# Patient Record
Sex: FEMALE | Race: WHITE | URBAN - METROPOLITAN AREA
[De-identification: names, ages, dates, MRNs, and addresses within clinical notes are randomized per-mention and may not be internally consistent; named-entity substitution may affect disease eponyms.]

---

## 2017-01-09 ENCOUNTER — OFFICE VISIT (OUTPATIENT)
Dept: FAMILY MEDICINE | Facility: CLINIC | Age: 52
End: 2017-01-09
Payer: COMMERCIAL

## 2017-01-09 VITALS
SYSTOLIC BLOOD PRESSURE: 112 MMHG | TEMPERATURE: 99.4 F | HEART RATE: 74 BPM | WEIGHT: 154.6 LBS | BODY MASS INDEX: 23.84 KG/M2 | DIASTOLIC BLOOD PRESSURE: 62 MMHG

## 2017-01-09 DIAGNOSIS — R21 RASH: ICD-10-CM

## 2017-01-09 DIAGNOSIS — R07.0 THROAT PAIN: Primary | ICD-10-CM

## 2017-01-09 DIAGNOSIS — J01.90 ACUTE SINUSITIS WITH SYMPTOMS > 10 DAYS: ICD-10-CM

## 2017-01-09 LAB
DEPRECATED S PYO AG THROAT QL EIA: NORMAL
MICRO REPORT STATUS: NORMAL
SPECIMEN SOURCE: NORMAL

## 2017-01-09 PROCEDURE — 99000 SPECIMEN HANDLING OFFICE-LAB: CPT | Performed by: PHYSICIAN ASSISTANT

## 2017-01-09 PROCEDURE — 99213 OFFICE O/P EST LOW 20 MIN: CPT | Performed by: PHYSICIAN ASSISTANT

## 2017-01-09 PROCEDURE — 87880 STREP A ASSAY W/OPTIC: CPT | Performed by: PHYSICIAN ASSISTANT

## 2017-01-09 PROCEDURE — 87081 CULTURE SCREEN ONLY: CPT | Mod: 90 | Performed by: PHYSICIAN ASSISTANT

## 2017-01-09 RX ORDER — DESONIDE 0.5 MG/G
CREAM TOPICAL
Qty: 60 G | Refills: 0 | Status: SHIPPED | OUTPATIENT
Start: 2017-01-09

## 2017-01-09 ASSESSMENT — ENCOUNTER SYMPTOMS
COUGH: 1
WEAKNESS: 0
HEADACHES: 1
SORE THROAT: 0
VOMITING: 0
MUSCULOSKELETAL NEGATIVE: 1
SPUTUM PRODUCTION: 0
WHEEZING: 0
EYE PAIN: 0
EYE REDNESS: 0
FEVER: 0
NAUSEA: 0
PSYCHIATRIC NEGATIVE: 1
CHILLS: 0
EYE DISCHARGE: 0
SHORTNESS OF BREATH: 0
STRIDOR: 0

## 2017-01-09 NOTE — Clinical Note
[facility]  [address] [place] [address]  Phone: [phone]  Fax: [phone]    January 11, 2017    Marlene Spear  [id number] [facility] [address]              Dear MsBarbara Spear,        The results of your recent throat culture were negative.  If you have any further questions or concerns please contact the clinic            Sincerely,      Kelby Caban PA-C/ rohan

## 2017-01-09 NOTE — PROGRESS NOTES
HPI    SUBJECTIVE:                                                    Marlene Spear is a 51 year old female who presents to clinic today for 10-day history of sore throat, postnasal drainage as well as a cough.    ENT Symptoms             Symptoms: cc Present Absent Comment   Fever/Chills  x  Chills    Fatigue  x     Muscle Aches  x     Eye Irritation  x     Sneezing   x    Nasal Devendra/Drg  x     Sinus Pressure/Pain  x     Loss of smell  x     Dental pain  x     Sore Throat  x  Better today then yesterday    Swollen Glands   x    Ear Pain/Fullness  x     Cough  x     Wheeze   x    Chest Pain   x    Shortness of breath   x    Rash   x    Other         Symptom duration:  10 days   Symptom severity:     Treatments tried:     Contacts:  Co workers      Problem list and histories reviewed & adjusted, as indicated.  Additional history: as documented    Patient Active Problem List   Diagnosis     CARDIOVASCULAR SCREENING; LDL GOAL LESS THAN 160     Papilloma of breast     Bloody discharge from nipple     Past Surgical History   Procedure Laterality Date     Surgical history of -        vag delivery     Biopsy breast needle localization  12/26/2012     Procedure: BIOPSY BREAST NEEDLE LOCALIZATION;  Left Breast needle guided excisional biopsy;  Surgeon: Lenore Carrillo MD;  Location: [facility] OR       Social History   Substance Use Topics     Smoking status: Never Smoker      Smokeless tobacco: Never Used     Alcohol Use: Yes      Comment: rare     Family History   Problem Relation Age of Onset     Hypertension Mother      DIABETES Mother      CANCER Mother      breast -metastatic     CANCER Father      myelodysplasia, and prostate cancer     Hypertension Maternal Grandmother      HEART DISEASE Maternal Grandfather      MI     Neurologic Disorder Paternal Grandmother      Parkinson     Prostate Cancer Paternal Grandfather      Hypertension Brother      Obesity Brother      HEART DISEASE Brother      Sammie parkinson-  white syndrome     Congenital Anomalies Daughter      Sprenglers Deformity     CANCER Paternal Aunt      pancreatic      Alzheimer Disease Father      suspected     DIABETES Brother          Current Outpatient Prescriptions   Medication Sig Dispense Refill     amoxicillin-clavulanate (AUGMENTIN) 875-125 MG per tablet Take 1 tablet by mouth 2 times daily 20 tablet 0     desonide (DESOWEN) 0.05 % cream Apply sparingly to affected area three times daily as needed. 60 g 0     MULTIVITAMINS OR TABS ONE DAILY 100 3     IBUPROFEN 200 MG OR CAPS 1 CAPSULE EVERY 4 TO 6 HOURS AS NEEDED       VITAMIN D3 1000 UNIT OR CAPS 1 CAPSULE DAILY       VITAMIN B-6 100 MG OR TABS one to two daily       Allergies   Allergen Reactions     Nkda [No Known Drug Allergies]      Problem list, Medication list, Allergies, and Medical/Social/Surgical histories reviewed in [facility] and updated as appropriate.          Review of Systems   Constitutional: Negative for fever and chills.   HENT: Positive for congestion. Negative for ear discharge, ear pain, hearing loss and sore throat.    Eyes: Negative for pain, discharge and redness.   Respiratory: Positive for cough. Negative for sputum production, shortness of breath, wheezing and stridor.    Cardiovascular: Negative for chest pain.   Gastrointestinal: Negative for nausea and vomiting.   Genitourinary: Negative.    Musculoskeletal: Negative.    Skin: Negative for itching and rash.   Neurological: Positive for headaches. Negative for weakness.   Endo/Heme/Allergies: Negative.    Psychiatric/Behavioral: Negative.          Physical Exam   Constitutional: She is oriented to person, place, and time and well-developed, well-nourished, and in no distress.   HENT:   Head: Normocephalic and atraumatic.   Right Ear: Hearing, tympanic membrane, external ear and ear canal normal.   Left Ear: Hearing, tympanic membrane, external ear and ear canal normal.   Nose: Sinus tenderness present. Right sinus exhibits  maxillary sinus tenderness. Left sinus exhibits maxillary sinus tenderness.   Mouth/Throat: Oropharyngeal exudate present.   Eyes: Conjunctivae, EOM and lids are normal. Pupils are equal, round, and reactive to light.   Neck: Normal range of motion and full passive range of motion without pain. Neck supple. Carotid bruit is not present. No tracheal deviation present. No thyroid mass and no thyromegaly present.   Cardiovascular: Normal rate, regular rhythm, normal heart sounds and normal pulses.    Pulmonary/Chest: Effort normal and breath sounds normal.   Abdominal: Soft. Normal appearance. There is no tenderness.   Musculoskeletal: Normal range of motion.   Lymphadenopathy:     She has no cervical adenopathy.   Neurological: She is alert and oriented to person, place, and time.   Skin: Skin is warm, dry and intact.   Psychiatric: Mood, memory, affect and judgment normal.       (R07.0) Throat pain  (primary encounter diagnosis)  Comment:   Plan: Strep, Rapid Screen, Beta strep group A culture            (J01.90) Acute sinusitis with symptoms > 10 days  Comment:   Plan: amoxicillin-clavulanate (AUGMENTIN) 875-125 MG         per tablet            (R21) Rash  Comment:   Plan: desonide (DESOWEN) 0.05 % cream            Strep screen was negative and this appears to be a sinus infection will treat with Augmentin

## 2017-01-09 NOTE — NURSING NOTE
"Chief Complaint   Patient presents with     Throat Problem     Sinus Problem     /62 mmHg  Pulse 74  Temp(Src) 99.4  F (37.4  C) (Tympanic)  Wt 154 lb 9.6 oz (70.126 kg) Estimated body mass index is 23.84 kg/(m^2) as calculated from the following:    Height as of 1/8/13: 5' 7.5\" (1.715 m).    Weight as of this encounter: 154 lb 9.6 oz (70.126 kg).  bp completed using cuff size: regular      Health Maintenance that is potentially due pending provider review:  Mammogram, Pap Smear and Colonoscopy/FIT    Patient declines mammogram, colon cancer screening and physical at this time     Valentine SANTIZO CMA    "

## 2017-01-11 LAB
BACTERIA SPEC CULT: NORMAL
MICRO REPORT STATUS: NORMAL
SPECIMEN SOURCE: NORMAL

## 2017-11-28 ENCOUNTER — TELEPHONE (OUTPATIENT)
Dept: FAMILY MEDICINE | Facility: CLINIC | Age: 52
End: 2017-11-28

## 2017-11-28 NOTE — TELEPHONE ENCOUNTER
Reviewing charts. Patient is due for a physical with pap smear, a mammogram, and a colonoscopy.    Called and left message for patient to call clinic back. When patient calls back please help her schedule a physical and a mammogram.    Ladonna Cruz MA

## 2018-06-06 ENCOUNTER — OFFICE VISIT (OUTPATIENT)
Dept: FAMILY MEDICINE | Facility: CLINIC | Age: 53
End: 2018-06-06
Payer: COMMERCIAL

## 2018-06-06 VITALS
SYSTOLIC BLOOD PRESSURE: 120 MMHG | HEART RATE: 72 BPM | RESPIRATION RATE: 16 BRPM | BODY MASS INDEX: 23.67 KG/M2 | DIASTOLIC BLOOD PRESSURE: 80 MMHG | TEMPERATURE: 97.2 F | WEIGHT: 153.4 LBS

## 2018-06-06 DIAGNOSIS — N76.0 VAGINITIS AND VULVOVAGINITIS: ICD-10-CM

## 2018-06-06 DIAGNOSIS — R31.9 HEMATURIA, UNSPECIFIED TYPE: Primary | ICD-10-CM

## 2018-06-06 LAB
ALBUMIN UR-MCNC: NEGATIVE MG/DL
APPEARANCE UR: CLEAR
BILIRUB UR QL STRIP: NEGATIVE
COLOR UR AUTO: YELLOW
GLUCOSE UR STRIP-MCNC: NEGATIVE MG/DL
HGB UR QL STRIP: ABNORMAL
KETONES UR STRIP-MCNC: NEGATIVE MG/DL
LEUKOCYTE ESTERASE UR QL STRIP: NEGATIVE
NITRATE UR QL: NEGATIVE
NON-SQ EPI CELLS #/AREA URNS LPF: NORMAL /LPF
PH UR STRIP: 7 PH (ref 5–7)
RBC #/AREA URNS AUTO: NORMAL /HPF
SOURCE: ABNORMAL
SP GR UR STRIP: 1.01 (ref 1–1.03)
SPECIMEN SOURCE: ABNORMAL
UROBILINOGEN UR STRIP-ACNC: 0.2 EU/DL (ref 0.2–1)
WBC #/AREA URNS AUTO: NORMAL /HPF
WET PREP SPEC: ABNORMAL

## 2018-06-06 PROCEDURE — 87210 SMEAR WET MOUNT SALINE/INK: CPT | Performed by: PHYSICIAN ASSISTANT

## 2018-06-06 PROCEDURE — 81001 URINALYSIS AUTO W/SCOPE: CPT | Performed by: PHYSICIAN ASSISTANT

## 2018-06-06 PROCEDURE — 99213 OFFICE O/P EST LOW 20 MIN: CPT | Performed by: PHYSICIAN ASSISTANT

## 2018-06-06 RX ORDER — METRONIDAZOLE 500 MG/1
500 TABLET ORAL 2 TIMES DAILY
Qty: 14 TABLET | Refills: 0 | Status: SHIPPED | OUTPATIENT
Start: 2018-06-06 | End: 2018-10-29

## 2018-06-06 NOTE — NURSING NOTE
"Chief Complaint   Patient presents with     UTI       Initial /80 (BP Location: Right arm, Patient Position: Chair, Cuff Size: Adult Regular)  Pulse 72  Temp 97.2  F (36.2  C) (Tympanic)  Resp 16  Wt 153 lb 6.4 oz (69.6 kg)  LMP 12/23/2012  BMI 23.67 kg/m2 Estimated body mass index is 23.67 kg/(m^2) as calculated from the following:    Height as of 1/8/13: 5' 7.5\" (1.715 m).    Weight as of this encounter: 153 lb 6.4 oz (69.6 kg).      Health Maintenance that is potentially due pending provider review:  NONE    Emilie Benedict MA  8:21 AM 6/6/2018  .        "

## 2018-06-06 NOTE — PROGRESS NOTES
SUBJECTIVE:   Marlene Spear is a 53 year old female who presents to clinic today for mild gross hematuria since Sunday, as well as vaginal irritation and burning that has been on and off for several weeks before that. She has not had any dysuria, dyspareunia or urinary frequency. She has had some vaginal discharge as well as urinary urgency. No odor noticed. She has also had some mild lower back pain that comes and goes and can be on either side at different times. This back pain is not associated with urination or positional changes. She does have a history of scoliosis, and says she has been taking on a lot of weekend projects at home that may have strained her back. She did have a ruptured ovarian cyst on her right side 14 years ago. LMP was in May 2016. No new sexual partners or dyspareunia. She is not concerned of STI's. She has not had any fevers or chills, nausea, vomiting, abdominal pain, or bowel changes.  No history of frequent UTI's or kidney stones.         URINARY TRACT SYMPTOMS  Onset: blood started on Sunday    Description:   Painful urination (Dysuria): no   Blood in urine (Hematuria): YES  Delay in urine (Hesitency): no   Urgency - Yes    Intensity: mild    Progression of Symptoms:  worsening    Accompanying Signs & Symptoms:  Fever/chills: no   Flank pain YES- lower back  Nausea and vomiting: no   Any vaginal symptoms: vaginal burning  Abdominal/Pelvic Pain: YES    History:   History of frequent UTI's: no   History of kidney stones: no   Sexually Active: YES- occasional  Possibility of pregnancy: No    Precipitating factors:   no    Therapies Tried and outcome: none    Problem list and histories reviewed & adjusted, as indicated.  Additional history: as documented    Patient Active Problem List   Diagnosis     CARDIOVASCULAR SCREENING; LDL GOAL LESS THAN 160     Papilloma of breast     Bloody discharge from nipple     Past Surgical History:   Procedure Laterality Date     BIOPSY BREAST NEEDLE  LOCALIZATION  12/26/2012    Procedure: BIOPSY BREAST NEEDLE LOCALIZATION;  Left Breast needle guided excisional biopsy;  Surgeon: Lenore Carrillo MD;  Location: [facility] OR     SURGICAL HISTORY OF -       vag delivery       Social History   Substance Use Topics     Smoking status: Never Smoker     Smokeless tobacco: Never Used     Alcohol use Yes      Comment: rare     Family History   Problem Relation Age of Onset     Hypertension Mother      DIABETES Mother      CANCER Mother      breast -metastatic     CANCER Father      myelodysplasia, and prostate cancer     Hypertension Maternal Grandmother      HEART DISEASE Maternal Grandfather      MI     Neurologic Disorder Paternal Grandmother      Parkinson     Prostate Cancer Paternal Grandfather      Hypertension Brother      Obesity Brother      HEART DISEASE Brother      Sammie parkinson- white syndrome     Congenital Anomalies Daughter      Sprenglers Deformity     CANCER Paternal Aunt      pancreatic      Alzheimer Disease Father      suspected     DIABETES Brother          Current Outpatient Prescriptions   Medication Sig Dispense Refill     metroNIDAZOLE (FLAGYL) 500 MG tablet Take 1 tablet (500 mg) by mouth 2 times daily 14 tablet 0     desonide (DESOWEN) 0.05 % cream Apply sparingly to affected area three times daily as needed. (Patient not taking: Reported on 6/6/2018) 60 g 0     IBUPROFEN 200 MG OR CAPS 1 CAPSULE EVERY 4 TO 6 HOURS AS NEEDED       MULTIVITAMINS OR TABS ONE DAILY 100 3     VITAMIN B-6 100 MG OR TABS one to two daily       VITAMIN D3 1000 UNIT OR CAPS 1 CAPSULE DAILY       Allergies   Allergen Reactions     Nkda [No Known Drug Allergies]        Reviewed and updated as needed this visit by clinical staff       Reviewed and updated as needed this visit by Provider         ROS:  CONSTITUTIONAL:NEGATIVE for fever, chills, change in weight  ENT/MOUTH: NEGATIVE for ear, mouth and throat problems  RESP:NEGATIVE for significant cough or SOB  CV:  NEGATIVE for chest pain, palpitations or peripheral edema  GI: NEGATIVE for nausea, abdominal pain, heartburn, or change in bowel habits  : pstomenopausal.Positive for urgency, vaginal discharge and bleeding  MUSCULOSKELETAL: NEGATIVE for significant arthralgias or myalgia. Off and on low back pain, alternating sides.    OBJECTIVE:     /80 (BP Location: Right arm, Patient Position: Chair, Cuff Size: Adult Regular)  Pulse 72  Temp 97.2  F (36.2  C) (Tympanic)  Resp 16  Wt 153 lb 6.4 oz (69.6 kg)  LMP 12/23/2012  BMI 23.67 kg/m2  Body mass index is 23.67 kg/(m^2).  GENERAL: healthy, alert and no distress  NECK: no adenopathy, no asymmetry, masses, or scars and thyroid normal to palpation  RESP: lungs clear to auscultation - no rales, rhonchi or wheezes  CV: regular rate and rhythm, normal S1 S2, no S3 or S4, no murmur, click or rub, no peripheral edema and peripheral pulses strong  ABDOMEN: soft, nontender, no hepatosplenomegaly, no masses and bowel sounds normal  MS: no gross musculoskeletal defects noted, no edema. No back pain or tenderness reproduced. No CVA tenderness.   SKIN: no suspicious lesions or rashes    Diagnostic Test Results:  Results for orders placed or performed in visit on 06/06/18 (from the past 24 hour(s))   *UA reflex to Microscopic and Culture ([place] and [facility] (except [place] and [place])   Result Value Ref Range    Color Urine Yellow     Appearance Urine Clear     Glucose Urine Negative NEG^Negative mg/dL    Bilirubin Urine Negative NEG^Negative    Ketones Urine Negative NEG^Negative mg/dL    Specific Gravity Urine 1.015 1.003 - 1.035    Blood Urine Small (A) NEG^Negative    pH Urine 7.0 5.0 - 7.0 pH    Protein Albumin Urine Negative NEG^Negative mg/dL    Urobilinogen Urine 0.2 0.2 - 1.0 EU/dL    Nitrite Urine Negative NEG^Negative    Leukocyte Esterase Urine Negative NEG^Negative    Source Midstream Urine    Urine Microscopic   Result Value Ref Range    WBC Urine 0  - 5 OTO5^0 - 5 /HPF    RBC Urine O - 2 OTO2^O - 2 /HPF    Squamous Epithelial /LPF Urine Few FEW^Few /LPF   Wet prep   Result Value Ref Range    Specimen Description Vagina     Wet Prep No yeast seen     Wet Prep Clue cells seen (A)     Wet Prep No Trichomonas seen        ASSESSMENT/PLAN:     Marlene was seen today for uti.    Diagnoses and all orders for this visit:    Hematuria, unspecified type  -     *UA reflex to Microscopic and Culture ([place] and [facility] (except [place] and [place])  -     Urine Microscopic    Vaginitis and vulvovaginitis  -     Wet prep  -     metroNIDAZOLE (FLAGYL) 500 MG tablet; Take 1 tablet (500 mg) by mouth 2 times daily    Comments: Will follow up if symptoms continue after taking Flagyl. Return to clinic if continued bleeding, back pain, vaginal irritation or urinary symptoms. Will contact patient with any pertinent urine culture results. If symptoms do not resolve, will consider abdominal ultrasound. Patient instructed to avoid alcohol while on Flagyl.      GUERRERO AlcazarS  6/6/18    Kelby Caban PA-C  [facility]

## 2018-06-06 NOTE — MR AVS SNAPSHOT
After Visit Summary   6/6/2018    Marlene Spear    MRN: [id number]           Patient Information     Date Of Birth          1965        Visit Information        Provider Department      6/6/2018 8:00 AM Kelby Caban PA-C [facility]        Today's Diagnoses     Hematuria, unspecified type    -  1    Vaginitis and vulvovaginitis           Follow-ups after your visit        Follow-up notes from your care team     Return if symptoms worsen or fail to improve.      Who to contact     If you have questions or need follow up information about today's clinic visit or your schedule please contact [facility] directly at [phone].  Normal or non-critical lab and imaging results will be communicated to you by MyChart, letter or phone within 4 business days after the clinic has received the results. If you do not hear from us within 7 days, please contact the clinic through [employer] or phone. If you have a critical or abnormal lab result, we will notify you by phone as soon as possible.  Submit refill requests through [employer] or call your pharmacy and they will forward the refill request to us. Please allow 3 business days for your refill to be completed.          Additional Information About Your Visit        MyChart Information     [employer] gives you secure access to your electronic health record. If you see a primary care provider, you can also send messages to your care team and make appointments. If you have questions, please call your primary care clinic.  If you do not have a primary care provider, please call [phone] and they will assist you.        Care EveryWhere ID     This is your Care EveryWhere ID. This could be used by other organizations to access your [facility] medical records  [id number]        Your Vitals Were     Pulse Temperature Respirations Last Period BMI (Body Mass Index)       72 97.2  F (36.2  C) (Tympanic) 16 12/23/2012 23.67 kg/m2         Blood Pressure from Last 3 Encounters:   06/06/18 120/80   01/09/17 112/62   11/13/16 136/74    Weight from Last 3 Encounters:   06/06/18 153 lb 6.4 oz (69.6 kg)   01/09/17 154 lb 9.6 oz (70.1 kg)   11/13/16 153 lb 9.6 oz (69.7 kg)              We Performed the Following     *UA reflex to Microscopic and Culture ([place] and [facility] (except [place] and [place])     Urine Microscopic     Wet prep          Today's Medication Changes          These changes are accurate as of 6/6/18 12:56 PM.  If you have any questions, ask your nurse or doctor.               Start taking these medicines.        Dose/Directions    metroNIDAZOLE 500 MG tablet   Commonly known as:  FLAGYL   Used for:  Vaginitis and vulvovaginitis   Started by:  Kelby Caban PA-C        Dose:  500 mg   Take 1 tablet (500 mg) by mouth 2 times daily   Quantity:  14 tablet   Refills:  0            Where to get your medicines      These medications were sent to [facility] [place] [address]     Phone:  [phone]     metroNIDAZOLE 500 MG tablet                Primary Care Provider Office Phone # Fax #    [facility] [phone]       [address] [place], [place] [address]        Equal Access to Services     AMBIKA CALDWELL AH: Lavonne vee hadasho Soomaali, waaxda luqadaha, qaybta kaalmada adeegmyke, stephan nelson. So [facility] [phone].    ATENCIÓN: Si habla español, tiene a dickens disposición servicios gratuitos de asistencia lingüística. Llame al [phone].    We comply with applicable federal civil rights laws and [place] laws. We do not discriminate on the basis of race, color, national origin, age, disability, sex, sexual orientation, or gender identity.            Thank you!     Thank you for choosing [facility]  for your care. Our goal is always to provide you with excellent  care. Hearing back from our patients is one way we can continue to improve our services. Please take a few minutes to complete the written survey that you may receive in the mail after your visit with us. Thank you!             Your Updated Medication List - Protect others around you: Learn how to safely use, store and throw away your medicines at www.disposemymeds.org.          This list is accurate as of 6/6/18 12:56 PM.  Always use your most recent med list.                   Brand Name Dispense Instructions for use Diagnosis    desonide 0.05 % cream    DESOWEN    60 g    Apply sparingly to affected area three times daily as needed.    Rash       ibuprofen 200 MG capsule      1 CAPSULE EVERY 4 TO 6 HOURS AS NEEDED        metroNIDAZOLE 500 MG tablet    FLAGYL    14 tablet    Take 1 tablet (500 mg) by mouth 2 times daily    Vaginitis and vulvovaginitis       multivitamin per tablet     100    ONE DAILY    Iron deficiency anemia       pyridoxine 100 MG tablet    VITAMIN B-6     one to two daily        vitamin D3 1000 units Caps      1 CAPSULE DAILY

## 2018-06-10 ENCOUNTER — HEALTH MAINTENANCE LETTER (OUTPATIENT)
Age: 53
End: 2018-06-10

## 2018-10-05 ENCOUNTER — TELEPHONE (OUTPATIENT)
Dept: FAMILY MEDICINE | Facility: CLINIC | Age: 53
End: 2018-10-05

## 2018-10-05 NOTE — TELEPHONE ENCOUNTER
10/5/2018    Call Regarding [facility] Mammogram    Attempt 1    Message on voicemail    Patient is also due for: ReattributionPhysical    Outreach   SV

## 2018-10-29 ENCOUNTER — OFFICE VISIT (OUTPATIENT)
Dept: FAMILY MEDICINE | Facility: CLINIC | Age: 53
End: 2018-10-29
Payer: COMMERCIAL

## 2018-10-29 VITALS
HEIGHT: 67 IN | RESPIRATION RATE: 12 BRPM | BODY MASS INDEX: 24.01 KG/M2 | WEIGHT: 153 LBS | DIASTOLIC BLOOD PRESSURE: 78 MMHG | SYSTOLIC BLOOD PRESSURE: 122 MMHG

## 2018-10-29 DIAGNOSIS — L24.89 IRRITANT CONTACT DERMATITIS DUE TO OTHER AGENTS: Primary | ICD-10-CM

## 2018-10-29 DIAGNOSIS — R39.89 OTHER SYMPTOMS AND SIGNS INVOLVING THE GENITOURINARY SYSTEM: ICD-10-CM

## 2018-10-29 LAB
ALBUMIN UR-MCNC: NEGATIVE MG/DL
APPEARANCE UR: CLEAR
BILIRUB UR QL STRIP: NEGATIVE
COLOR UR AUTO: YELLOW
GLUCOSE UR STRIP-MCNC: NEGATIVE MG/DL
HGB UR QL STRIP: ABNORMAL
KETONES UR STRIP-MCNC: NEGATIVE MG/DL
LEUKOCYTE ESTERASE UR QL STRIP: NEGATIVE
NITRATE UR QL: NEGATIVE
PH UR STRIP: 7.5 PH (ref 5–7)
RBC #/AREA URNS AUTO: NORMAL /HPF
SOURCE: ABNORMAL
SP GR UR STRIP: 1.01 (ref 1–1.03)
SPECIMEN SOURCE: NORMAL
UROBILINOGEN UR STRIP-ACNC: 0.2 EU/DL (ref 0.2–1)
WBC #/AREA URNS AUTO: NORMAL /HPF
WET PREP SPEC: NORMAL

## 2018-10-29 PROCEDURE — 99213 OFFICE O/P EST LOW 20 MIN: CPT | Performed by: NURSE PRACTITIONER

## 2018-10-29 PROCEDURE — 87210 SMEAR WET MOUNT SALINE/INK: CPT | Performed by: NURSE PRACTITIONER

## 2018-10-29 PROCEDURE — 81001 URINALYSIS AUTO W/SCOPE: CPT | Performed by: NURSE PRACTITIONER

## 2018-10-29 NOTE — PROGRESS NOTES
SUBJECTIVE:   Marlene Spear is a 53 year old female who presents to clinic today for the following health issues:      URINARY TRACT SYMPTOMS  Onset: 2-3 days    Description: Pt states that she had a UTI in June and was treated with antibiotics, but states that the sx never fully went away. Sx have been constant the last couple days.  Painful urination (Dysuria): YES  Blood in urine (Hematuria): YES  Delay in urine (Hesitency): no     Intensity: moderate    Progression of Symptoms:  worsening and constant    Accompanying Signs & Symptoms:  Fever/chills: no   Flank pain no (Mid back pain)  Nausea and vomiting: no   Any vaginal symptoms: vaginal discharge (slight discharge yesterday)  Abdominal/Pelvic Pain: no     History:   History of frequent UTI's: YES  History of kidney stones: no   Sexually Active: YES  Possibility of pregnancy: No    Precipitating factors:   Burning, blood in urine, frequency, Urgency    Therapies Tried and outcome: Cranberry juice prn (contraindicated in Coumadin patients) and Increase fluid intake    Reviewed patients history and she was seen in June with positive clue cells and normal UA.  Patient states it burns on the outside when she pees and she only had discharge one day that was more but not consistently.  She did start using a new soap.  No history of kidney stones.  States that she has some back pain.    Problem list and histories reviewed & adjusted, as indicated.  Additional history: as documented    Patient Active Problem List   Diagnosis     CARDIOVASCULAR SCREENING; LDL GOAL LESS THAN 160     Papilloma of breast     Bloody discharge from nipple     Past Surgical History:   Procedure Laterality Date     BIOPSY BREAST NEEDLE LOCALIZATION  12/26/2012    Procedure: BIOPSY BREAST NEEDLE LOCALIZATION;  Left Breast needle guided excisional biopsy;  Surgeon: Lenore Carrillo MD;  Location: [facility]     SURGICAL HISTORY OF -       vag delivery       Social History   Substance  "Use Topics     Smoking status: Never Smoker     Smokeless tobacco: Never Used     Alcohol use Yes      Comment: rare     Family History   Problem Relation Age of Onset     Hypertension Mother      Diabetes Mother      Cancer Mother      breast -metastatic     Cancer Father      myelodysplasia, and prostate cancer     Hypertension Maternal Grandmother      HEART DISEASE Maternal Grandfather      MI     Neurologic Disorder Paternal Grandmother      Parkinson     Prostate Cancer Paternal Grandfather      Hypertension Brother      Obesity Brother      HEART DISEASE Brother      Sammie parkinson- white syndrome     Congenital Anomalies Daughter      Sprenglers Deformity     Cancer Paternal Aunt      pancreatic      Alzheimer Disease Father      suspected     Diabetes Brother          Current Outpatient Prescriptions   Medication Sig Dispense Refill     desonide (DESOWEN) 0.05 % cream Apply sparingly to affected area three times daily as needed. 60 g 0     IBUPROFEN 200 MG OR CAPS 1 CAPSULE EVERY 4 TO 6 HOURS AS NEEDED       MULTIVITAMINS OR TABS ONE DAILY 100 3     VITAMIN B-6 100 MG OR TABS one to two daily       VITAMIN D3 1000 UNIT OR CAPS 1 CAPSULE DAILY       Allergies   Allergen Reactions     Nkda [No Known Drug Allergies]        Reviewed and updated as needed this visit by clinical staff  Tobacco  Allergies  Meds  Problems       Reviewed and updated as needed this visit by Provider  Allergies  Meds  Problems         ROS:  CONSTITUTIONAL: NEGATIVE for fever, chills, change in weight  RESP: NEGATIVE for significant cough or SOB  CV: NEGATIVE for chest pain, palpitations or peripheral edema  : dysuria and vaginal discharge once this weekend  PSYCHIATRIC: NEGATIVE for changes in mood or affect  ROS otherwise negative    OBJECTIVE:     /78  Resp 12  Ht 5' 6.75\" (1.695 m)  Wt 153 lb (69.4 kg)  LMP 12/23/2012  BMI 24.14 kg/m2  Body mass index is 24.14 kg/(m^2).  GENERAL: healthy, alert and no " distress  RESP: lungs clear to auscultation - no rales, rhonchi or wheezes  CV: regular rate and rhythm, normal S1 S2, no S3 or S4, no murmur, click or rub, no peripheral edema and peripheral pulses strong  ABDOMEN: soft, nontender, no hepatosplenomegaly, no masses and bowel sounds normal  PSYCH: mentation appears normal, affect normal/bright    Diagnostic Test Results:  Results for orders placed or performed in visit on 10/29/18 (from the past 24 hour(s))   *UA reflex to Microscopic and Culture ([place] and [facility] (except [place] and [place])   Result Value Ref Range    Color Urine Yellow     Appearance Urine Clear     Glucose Urine Negative NEG^Negative mg/dL    Bilirubin Urine Negative NEG^Negative    Ketones Urine Negative NEG^Negative mg/dL    Specific Gravity Urine 1.015 1.003 - 1.035    Blood Urine Trace (A) NEG^Negative    pH Urine 7.5 (H) 5.0 - 7.0 pH    Protein Albumin Urine Negative NEG^Negative mg/dL    Urobilinogen Urine 0.2 0.2 - 1.0 EU/dL    Nitrite Urine Negative NEG^Negative    Leukocyte Esterase Urine Negative NEG^Negative    Source Midstream Urine    Urine Microscopic   Result Value Ref Range    WBC Urine 0 - 5 OTO5^0 - 5 /HPF    RBC Urine O - 2 OTO2^O - 2 /HPF   Wet prep   Result Value Ref Range    Specimen Description Vagina     Wet Prep No Trichomonas seen     Wet Prep No clue cells seen     Wet Prep No yeast seen        ASSESSMENT/PLAN:     1. Irritant contact dermatitis due to other agents  UA and Wet prep are negative.  Patient denies exposure or concern for STDs and declines testing.  Most likely new soap is the cause. Recommended removal of product use to the vaginal area.  She can use OTC hydrocortisone cream up to twice daily for symptoms.  Recommend f/u in clinic if any worsening symptoms or persistent symptoms for a week.    2. Other symptoms and signs involving the genitourinary system  - *UA reflex to Microscopic and Culture ([place] and [facility] (except [place]  and [place])  - Urine Microscopic  - Wet prep    See Patient Instructions    Erica Stubbs, DARIEL  [facility]

## 2018-10-29 NOTE — MR AVS SNAPSHOT
After Visit Summary   10/29/2018    Marlene Spear    MRN: [id number]           Patient Information     Date Of Birth          1965        Visit Information        Provider Department      10/29/2018 9:20 AM Erica Stubbs NP [facility]        Today's Diagnoses     Other symptoms and signs involving the genitourinary system    -  1      Care Instructions    1.  Stop using new soap or any products to the vaginal area.  2.  Use hydrocortisone cream over the counter for symptoms.  3.  Follow-up in 1 week if any persistent symptoms.  Come in sooner if worsening symptoms.          Follow-ups after your visit        Follow-up notes from your care team     Return in about 1 week (around 11/5/2018), or if symptoms worsen or fail to improve.      Who to contact     If you have questions or need follow up information about today's clinic visit or your schedule please contact [facility] directly at [phone].  Normal or non-critical lab and imaging results will be communicated to you by [employer], letter or phone within 4 business days after the clinic has received the results. If you do not hear from us within 7 days, please contact the clinic through [employer] or phone. If you have a critical or abnormal lab result, we will notify you by phone as soon as possible.  Submit refill requests through [employer] or call your pharmacy and they will forward the refill request to us. Please allow 3 business days for your refill to be completed.          Additional Information About Your Visit        MyChart Information     [employer] gives you secure access to your electronic health record. If you see a primary care provider, you can also send messages to your care team and make appointments. If you have questions, please call your primary care clinic.  If you do not have a primary care provider, please call [phone] and they will assist you.        Care EveryWhere ID      "This is your Care EveryWhere ID. This could be used by other organizations to access your [facility] medical records  [id number]        Your Vitals Were     Respirations Height Last Period BMI (Body Mass Index)          12 5' 6.75\" (1.695 m) 12/23/2012 24.14 kg/m2         Blood Pressure from Last 3 Encounters:   10/29/18 122/78   06/06/18 120/80   01/09/17 112/62    Weight from Last 3 Encounters:   10/29/18 153 lb (69.4 kg)   06/06/18 153 lb 6.4 oz (69.6 kg)   01/09/17 154 lb 9.6 oz (70.1 kg)              We Performed the Following     *UA reflex to Microscopic and Culture ([place] and [facility] (except [place] and [place])     Urine Microscopic     Wet prep        Primary Care Provider Office Phone # Fax #    [facility] [phone] [address] [place] [id number]        Equal Access to Services     AMBIKA CALDWELL : Hadii aad ku hadasho Soomaali, waaxda luqadaha, qaybta kaalmada adeegyada, waxay idiin haydorinan seth wolfe . So [facility] [phone].    ATENCIÓN: Si habla español, tiene a dickens disposición servicios gratuitos de asistencia lingüística. Llame al [phone].    We comply with applicable federal civil rights laws and [place] laws. We do not discriminate on the basis of race, color, national origin, age, disability, sex, sexual orientation, or gender identity.            Thank you!     Thank you for choosing [facility]  for your care. Our goal is always to provide you with excellent care. Hearing back from our patients is one way we can continue to improve our services. Please take a few minutes to complete the written survey that you may receive in the mail after your visit with us. Thank you!             Your Updated Medication List - Protect others around you: Learn how to safely use, store and throw away your medicines at www.disposemymeds.org.          This list is accurate as of 10/29/18 10:01 AM.  Always use your most " recent med list.                   Brand Name Dispense Instructions for use Diagnosis    desonide 0.05 % cream    DESOWEN    60 g    Apply sparingly to affected area three times daily as needed.    Rash       ibuprofen 200 MG capsule      1 CAPSULE EVERY 4 TO 6 HOURS AS NEEDED        multivitamin per tablet     100    ONE DAILY    Iron deficiency anemia       pyridoxine 100 MG tablet    VITAMIN B-6     one to two daily        vitamin D3 1000 units Caps      1 CAPSULE DAILY

## 2018-11-12 ENCOUNTER — TELEPHONE (OUTPATIENT)
Dept: FAMILY MEDICINE | Facility: CLINIC | Age: 53
End: 2018-11-12

## 2018-11-12 NOTE — TELEPHONE ENCOUNTER
Left message for patient to call the clinic.   We were reviewing charts and it appears that the patient is due for pap, colon cancer screening and mammogram.   Wondering if she is will to complete and of these things.   Valentine Diaz, CMA

## 2020-02-10 ENCOUNTER — HEALTH MAINTENANCE LETTER (OUTPATIENT)
Age: 55
End: 2020-02-10

## 2020-11-16 ENCOUNTER — HEALTH MAINTENANCE LETTER (OUTPATIENT)
Age: 55
End: 2020-11-16

## 2021-04-03 ENCOUNTER — HEALTH MAINTENANCE LETTER (OUTPATIENT)
Age: 56
End: 2021-04-03

## 2021-09-18 ENCOUNTER — HEALTH MAINTENANCE LETTER (OUTPATIENT)
Age: 56
End: 2021-09-18

## 2022-03-05 ENCOUNTER — HEALTH MAINTENANCE LETTER (OUTPATIENT)
Age: 57
End: 2022-03-05

## 2022-04-30 ENCOUNTER — HEALTH MAINTENANCE LETTER (OUTPATIENT)
Age: 57
End: 2022-04-30

## 2022-11-19 ENCOUNTER — HEALTH MAINTENANCE LETTER (OUTPATIENT)
Age: 57
End: 2022-11-19

## 2023-06-01 ENCOUNTER — HEALTH MAINTENANCE LETTER (OUTPATIENT)
Age: 58
End: 2023-06-01